# Patient Record
Sex: MALE | Race: OTHER | NOT HISPANIC OR LATINO | Employment: STUDENT | URBAN - NONMETROPOLITAN AREA
[De-identification: names, ages, dates, MRNs, and addresses within clinical notes are randomized per-mention and may not be internally consistent; named-entity substitution may affect disease eponyms.]

---

## 2025-02-28 ENCOUNTER — TELEPHONE (OUTPATIENT)
Dept: PRIMARY CARE | Facility: CLINIC | Age: 20
End: 2025-02-28

## 2025-02-28 NOTE — TELEPHONE ENCOUNTER
Pt came in and dropped off paper work that need filled out for a school trp. Put in your mail box.

## 2025-03-03 NOTE — TELEPHONE ENCOUNTER
Lmtcb - pt has not been seen for 2 years and would need to be scheduled for annual CPE before school field trip for can be signed. MW 3-3-25

## 2025-04-15 NOTE — PROGRESS NOTES
Subjective        Lakshmi Nava is a 20 y.o. male who presents for      HPI:    Dr Mccray pt pt         Chief Complaint   Patient presents with    Hypertension           What concern/ problem/pain/symptom  brings you here today? Bp concerns       how long has pt had sxs? In the past month       describe symptoms- not symptoms - pt feels well and is having no problems       When/where did pt last have BP taken? [facility] a month ago - SBP was >140 when pt had PE done for out of country physical trip       Pt is doing study abroad in [place] - May 18th- for 3 weeks         What was the number? 127/83  at home     Has pt ever been on Medication for BP? No      How much caffeinated beverages does pt drink? 1 or 2 a week               BP Readings from Last 6 Encounters:   04/18/25 129/70   02/20/23 105/57   01/24/22 110/67 (27%, Z = -0.61 /  48%, Z = -0.05)*   02/01/21 103/62 (17%, Z = -0.95 /  39%, Z = -0.28)*     *BP percentiles are based on the 2017 AAP Clinical Practice Guideline for boys           Social History     Tobacco Use   Smoking Status Never   Smokeless Tobacco Never         Social History     Substance and Sexual Activity   Alcohol Use Never          Review of Systems:    Review of Systems    Objective        Vitals:    04/18/25 0945   BP: 129/70   BP Location: Right arm   Patient Position: Sitting   BP Cuff Size: Large adult   Pulse: 67   Resp: 12   Temp: 36.6 °C (97.9 °F)   TempSrc: Temporal   SpO2: 95%   Weight: 95.5 kg (210 lb 8 oz)                       BP Readings from Last 3 Encounters:   04/18/25 129/70   02/20/23 105/57   01/24/22 110/67 (27%, Z = -0.61 /  48%, Z = -0.05)*     *BP percentiles are based on the 2017 AAP Clinical Practice Guideline for boys           Wt Readings from Last 3 Encounters:   04/18/25 95.5 kg (210 lb 8 oz)   02/20/23 85.8 kg (189 lb 4 oz) (91%, Z= 1.32)*   01/24/22 75.9 kg (82%, Z= 0.90)*     * Growth percentiles are based on CDC (Boys, 2-20 Years) data.         BMI  Readings from Last 3 Encounters:   04/18/25 29.99 kg/m²   02/20/23 26.96 kg/m² (90%, Z= 1.29)*   01/24/22 24.73 kg/m² (84%, Z= 0.99)*     * Growth percentiles are based on [facility] (Boys, 2-20 Years) data.           Assessment & Plan  Elevated BP without diagnosis of hypertension         Screening for diabetes mellitus    Orders:    Glucose; Future    Screening for cholesterol level    Orders:    Lipid Panel; Future           Discussed routine screening glucose and lipid   And follow up with PCP in 6 months to recheck BP               Follow up with PCP- Dr Mccray pt

## 2025-04-18 ENCOUNTER — APPOINTMENT (OUTPATIENT)
Dept: PRIMARY CARE | Facility: CLINIC | Age: 20
End: 2025-04-18

## 2025-04-18 VITALS
WEIGHT: 210.5 LBS | OXYGEN SATURATION: 95 % | HEART RATE: 67 BPM | TEMPERATURE: 97.9 F | RESPIRATION RATE: 12 BRPM | BODY MASS INDEX: 29.99 KG/M2 | SYSTOLIC BLOOD PRESSURE: 129 MMHG | DIASTOLIC BLOOD PRESSURE: 70 MMHG

## 2025-04-18 DIAGNOSIS — Z13.1 SCREENING FOR DIABETES MELLITUS: ICD-10-CM

## 2025-04-18 DIAGNOSIS — R03.0 ELEVATED BP WITHOUT DIAGNOSIS OF HYPERTENSION: Primary | ICD-10-CM

## 2025-04-18 DIAGNOSIS — Z13.220 SCREENING FOR CHOLESTEROL LEVEL: ICD-10-CM

## 2025-04-18 PROCEDURE — 99213 OFFICE O/P EST LOW 20 MIN: CPT | Performed by: FAMILY MEDICINE

## 2025-04-18 PROCEDURE — 1036F TOBACCO NON-USER: CPT | Performed by: FAMILY MEDICINE

## 2025-04-18 RX ORDER — ALBUTEROL SULFATE 0.83 MG/ML
SOLUTION RESPIRATORY (INHALATION) EVERY 6 HOURS
COMMUNITY

## 2025-04-18 RX ORDER — ALBUTEROL SULFATE 90 UG/1
2 INHALANT RESPIRATORY (INHALATION) EVERY 6 HOURS PRN
COMMUNITY

## 2025-04-18 RX ORDER — ALBUTEROL SULFATE 4 MG/1
4 TABLET ORAL 3 TIMES DAILY
COMMUNITY

## 2025-10-21 ENCOUNTER — APPOINTMENT (OUTPATIENT)
Dept: PRIMARY CARE | Facility: CLINIC | Age: 20
End: 2025-10-21
Payer: COMMERCIAL